# Patient Record
Sex: FEMALE | Race: WHITE | NOT HISPANIC OR LATINO | Employment: FULL TIME | URBAN - NONMETROPOLITAN AREA
[De-identification: names, ages, dates, MRNs, and addresses within clinical notes are randomized per-mention and may not be internally consistent; named-entity substitution may affect disease eponyms.]

---

## 2023-03-24 ENCOUNTER — TELEPHONE (OUTPATIENT)
Dept: PRIMARY CARE | Facility: CLINIC | Age: 53
End: 2023-03-24

## 2023-03-24 DIAGNOSIS — F17.200 SMOKING: Primary | ICD-10-CM

## 2023-03-24 RX ORDER — VARENICLINE TARTRATE 1 MG/1
1 TABLET, FILM COATED ORAL 2 TIMES DAILY
Qty: 60 TABLET | Refills: 2 | Status: SHIPPED | OUTPATIENT
Start: 2023-03-24 | End: 2023-08-24 | Stop reason: ALTCHOICE

## 2023-08-21 ENCOUNTER — TELEPHONE (OUTPATIENT)
Dept: PRIMARY CARE | Facility: CLINIC | Age: 53
End: 2023-08-21
Payer: COMMERCIAL

## 2023-08-21 DIAGNOSIS — Z00.00 ROUTINE GENERAL MEDICAL EXAMINATION AT A HEALTH CARE FACILITY: Primary | ICD-10-CM

## 2023-08-21 NOTE — PROGRESS NOTES
Subjective   Patient ID:   Maribel Thorpe is a 53 y.o. female who presents for Abdominal Pain.  HPI  Here with acute symptoms:  Symptoms x 1 month.  Abdominal pain.  LLQ pain.  Radiates to the back.  Off and on depending on what she eats.  Has actually improved over the last few days.  Has been watching her diet more closely and it has improved.  Denies blood in the stool.  Has history of diverticulitis.  Denies fever, chills.  Denies N/V/D.  Has also started taking probiotics which has helped.    Insomnia:  Sleeping 2-3 hours at night.  Had been on Trazodone before, but it was too high of a dose and she felt groggy.  Waking up almost every hour.    Labs were good yesterday.    Review of Systems  12 point review of systems negative unless stated above in HPI    Vitals:    08/24/23 0912   BP: 135/86   Pulse: 71   Resp: 18   SpO2: 97%     Physical Exam  General: Alert and oriented, well nourished, no acute distress.  Lungs: Clear to auscultation, non-labored respiration.  Heart: Normal rate, regular rhythm, no murmur, gallop or edema.  Abdomen: Slight TTP to the LLQ. No masses or distension.  Neurologic: Awake, alert, and oriented X3, CN II-XII intact.  Psychiatric: Cooperative, appropriate mood and affect.    Assessment/Plan   Labs from yesterday were reviewed.  Keep an eye on the abdominal pain for now.  We can pursue a CT scan if this comes back or gets worse.  I have sent in Trazodone to start for sleep.  Consider a sleep study.  If symptoms persist or worsen despite current plan of care, please contact your healthcare provider for further evaluation.  Patient instructed to contact the office if there are any questions regarding their care or treatment.   [facility] [phone].  [facility] [phone].    Fu 3 months  Diagnoses and all orders for this visit:  Primary insomnia  -     traZODone (Desyrel) 50 mg tablet; Take 1 tablet (50 mg) by mouth as needed at bedtime for  sleep.  LLQ pain  Hyperglycemia

## 2023-08-22 ENCOUNTER — LAB (OUTPATIENT)
Dept: LAB | Facility: LAB | Age: 53
End: 2023-08-22
Payer: COMMERCIAL

## 2023-08-22 DIAGNOSIS — Z00.00 ROUTINE GENERAL MEDICAL EXAMINATION AT A HEALTH CARE FACILITY: ICD-10-CM

## 2023-08-22 LAB
ALANINE AMINOTRANSFERASE (SGPT) (U/L) IN SER/PLAS: 15 U/L (ref 7–45)
ALBUMIN (G/DL) IN SER/PLAS: 4.2 G/DL (ref 3.4–5)
ALKALINE PHOSPHATASE (U/L) IN SER/PLAS: 73 U/L (ref 33–110)
ANION GAP IN SER/PLAS: 12 MMOL/L (ref 10–20)
ASPARTATE AMINOTRANSFERASE (SGOT) (U/L) IN SER/PLAS: 15 U/L (ref 9–39)
BASOPHILS (10*3/UL) IN BLOOD BY AUTOMATED COUNT: 0.03 X10E9/L (ref 0–0.1)
BASOPHILS/100 LEUKOCYTES IN BLOOD BY AUTOMATED COUNT: 0.3 % (ref 0–2)
BILIRUBIN TOTAL (MG/DL) IN SER/PLAS: 0.5 MG/DL (ref 0–1.2)
CALCIUM (MG/DL) IN SER/PLAS: 9.3 MG/DL (ref 8.6–10.3)
CARBON DIOXIDE, TOTAL (MMOL/L) IN SER/PLAS: 27 MMOL/L (ref 21–32)
CHLORIDE (MMOL/L) IN SER/PLAS: 105 MMOL/L (ref 98–107)
CHOLESTEROL (MG/DL) IN SER/PLAS: 165 MG/DL (ref 0–199)
CHOLESTEROL IN HDL (MG/DL) IN SER/PLAS: 78 MG/DL
CHOLESTEROL/HDL RATIO: 2.1
CREATININE (MG/DL) IN SER/PLAS: 0.8 MG/DL (ref 0.5–1.05)
EOSINOPHILS (10*3/UL) IN BLOOD BY AUTOMATED COUNT: 0.06 X10E9/L (ref 0–0.7)
EOSINOPHILS/100 LEUKOCYTES IN BLOOD BY AUTOMATED COUNT: 0.6 % (ref 0–6)
ERYTHROCYTE DISTRIBUTION WIDTH (RATIO) BY AUTOMATED COUNT: 12.3 % (ref 11.5–14.5)
ERYTHROCYTE MEAN CORPUSCULAR HEMOGLOBIN CONCENTRATION (G/DL) BY AUTOMATED: 32.7 G/DL (ref 32–36)
ERYTHROCYTE MEAN CORPUSCULAR VOLUME (FL) BY AUTOMATED COUNT: 100 FL (ref 80–100)
ERYTHROCYTES (10*6/UL) IN BLOOD BY AUTOMATED COUNT: 4.29 X10E12/L (ref 4–5.2)
GFR FEMALE: 87 ML/MIN/1.73M2
GLUCOSE (MG/DL) IN SER/PLAS: 106 MG/DL (ref 74–99)
HEMATOCRIT (%) IN BLOOD BY AUTOMATED COUNT: 42.8 % (ref 36–46)
HEMOGLOBIN (G/DL) IN BLOOD: 14 G/DL (ref 12–16)
IMMATURE GRANULOCYTES/100 LEUKOCYTES IN BLOOD BY AUTOMATED COUNT: 0.2 % (ref 0–0.9)
LDL: 71 MG/DL (ref 0–99)
LEUKOCYTES (10*3/UL) IN BLOOD BY AUTOMATED COUNT: 10.1 X10E9/L (ref 4.4–11.3)
LYMPHOCYTES (10*3/UL) IN BLOOD BY AUTOMATED COUNT: 1.6 X10E9/L (ref 1.2–4.8)
LYMPHOCYTES/100 LEUKOCYTES IN BLOOD BY AUTOMATED COUNT: 15.9 % (ref 13–44)
MONOCYTES (10*3/UL) IN BLOOD BY AUTOMATED COUNT: 0.56 X10E9/L (ref 0.1–1)
MONOCYTES/100 LEUKOCYTES IN BLOOD BY AUTOMATED COUNT: 5.6 % (ref 2–10)
NEUTROPHILS (10*3/UL) IN BLOOD BY AUTOMATED COUNT: 7.79 X10E9/L (ref 1.2–7.7)
NEUTROPHILS/100 LEUKOCYTES IN BLOOD BY AUTOMATED COUNT: 77.4 % (ref 40–80)
PLATELETS (10*3/UL) IN BLOOD AUTOMATED COUNT: 325 X10E9/L (ref 150–450)
POTASSIUM (MMOL/L) IN SER/PLAS: 4.6 MMOL/L (ref 3.5–5.3)
PROTEIN TOTAL: 6.5 G/DL (ref 6.4–8.2)
SODIUM (MMOL/L) IN SER/PLAS: 139 MMOL/L (ref 136–145)
THYROTROPIN (MIU/L) IN SER/PLAS BY DETECTION LIMIT <= 0.05 MIU/L: 0.6 MIU/L (ref 0.44–3.98)
TRIGLYCERIDE (MG/DL) IN SER/PLAS: 82 MG/DL (ref 0–149)
UREA NITROGEN (MG/DL) IN SER/PLAS: 10 MG/DL (ref 6–23)
VLDL: 16 MG/DL (ref 0–40)

## 2023-08-22 PROCEDURE — 36415 COLL VENOUS BLD VENIPUNCTURE: CPT

## 2023-08-22 PROCEDURE — 80061 LIPID PANEL: CPT

## 2023-08-22 PROCEDURE — 84443 ASSAY THYROID STIM HORMONE: CPT

## 2023-08-22 PROCEDURE — 80053 COMPREHEN METABOLIC PANEL: CPT

## 2023-08-22 PROCEDURE — 85025 COMPLETE CBC W/AUTO DIFF WBC: CPT

## 2023-08-24 ENCOUNTER — OFFICE VISIT (OUTPATIENT)
Dept: PRIMARY CARE | Facility: CLINIC | Age: 53
End: 2023-08-24
Payer: COMMERCIAL

## 2023-08-24 VITALS
HEART RATE: 71 BPM | BODY MASS INDEX: 23.67 KG/M2 | SYSTOLIC BLOOD PRESSURE: 135 MMHG | DIASTOLIC BLOOD PRESSURE: 86 MMHG | RESPIRATION RATE: 18 BRPM | OXYGEN SATURATION: 97 % | HEIGHT: 67 IN | WEIGHT: 150.8 LBS

## 2023-08-24 DIAGNOSIS — R73.9 HYPERGLYCEMIA: ICD-10-CM

## 2023-08-24 DIAGNOSIS — F51.01 PRIMARY INSOMNIA: Primary | ICD-10-CM

## 2023-08-24 DIAGNOSIS — R10.32 LLQ PAIN: ICD-10-CM

## 2023-08-24 PROBLEM — J01.10 ACUTE FRONTAL SINUSITIS: Status: RESOLVED | Noted: 2023-08-24 | Resolved: 2023-08-24

## 2023-08-24 PROBLEM — K57.90 DIVERTICULOSIS: Status: RESOLVED | Noted: 2023-08-24 | Resolved: 2023-08-24

## 2023-08-24 PROBLEM — F41.9 ANXIETY: Status: ACTIVE | Noted: 2023-08-24

## 2023-08-24 PROBLEM — E04.2 NONTOXIC MULTINODULAR GOITER: Status: ACTIVE | Noted: 2023-08-24

## 2023-08-24 PROBLEM — M25.521 RIGHT ELBOW PAIN: Status: RESOLVED | Noted: 2023-08-24 | Resolved: 2023-08-24

## 2023-08-24 PROBLEM — E03.9 HYPOTHYROIDISM: Status: ACTIVE | Noted: 2023-08-24

## 2023-08-24 PROBLEM — R79.89 ABNORMAL TSH: Status: ACTIVE | Noted: 2023-08-24

## 2023-08-24 PROBLEM — R05.9 COUGH: Status: RESOLVED | Noted: 2023-08-24 | Resolved: 2023-08-24

## 2023-08-24 PROBLEM — M79.10 MYALGIA: Status: RESOLVED | Noted: 2023-08-24 | Resolved: 2023-08-24

## 2023-08-24 PROBLEM — B34.9 VIRAL ILLNESS: Status: RESOLVED | Noted: 2023-08-24 | Resolved: 2023-08-24

## 2023-08-24 PROBLEM — L73.9 FOLLICULITIS: Status: RESOLVED | Noted: 2023-08-24 | Resolved: 2023-08-24

## 2023-08-24 PROBLEM — J02.9 ACUTE PHARYNGITIS: Status: RESOLVED | Noted: 2023-08-24 | Resolved: 2023-08-24

## 2023-08-24 PROBLEM — G47.00 INSOMNIA: Status: RESOLVED | Noted: 2023-08-24 | Resolved: 2023-08-24

## 2023-08-24 PROBLEM — M25.569 KNEE PAIN: Status: RESOLVED | Noted: 2023-08-24 | Resolved: 2023-08-24

## 2023-08-24 PROBLEM — J02.9 SORE THROAT: Status: RESOLVED | Noted: 2023-08-24 | Resolved: 2023-08-24

## 2023-08-24 PROBLEM — R42 DIZZINESS: Status: RESOLVED | Noted: 2023-08-24 | Resolved: 2023-08-24

## 2023-08-24 PROBLEM — J01.00 ACUTE MAXILLARY SINUSITIS: Status: RESOLVED | Noted: 2023-08-24 | Resolved: 2023-08-24

## 2023-08-24 PROBLEM — L40.9 PSORIASIS: Status: ACTIVE | Noted: 2023-08-24

## 2023-08-24 PROBLEM — K21.9 GASTROESOPHAGEAL REFLUX DISEASE: Status: ACTIVE | Noted: 2023-08-24

## 2023-08-24 PROBLEM — R94.6 ABNORMAL THYROID FUNCTION TEST: Status: ACTIVE | Noted: 2023-08-24

## 2023-08-24 PROBLEM — F17.200 SMOKING: Status: RESOLVED | Noted: 2023-08-24 | Resolved: 2023-08-24

## 2023-08-24 PROBLEM — R53.83 FATIGUE: Status: RESOLVED | Noted: 2023-08-24 | Resolved: 2023-08-24

## 2023-08-24 PROBLEM — J30.9 ALLERGIC RHINITIS: Status: RESOLVED | Noted: 2023-08-24 | Resolved: 2023-08-24

## 2023-08-24 PROBLEM — R10.9 ABDOMINAL PAIN: Status: ACTIVE | Noted: 2023-08-24

## 2023-08-24 PROBLEM — J20.9 ACUTE BRONCHITIS: Status: RESOLVED | Noted: 2023-08-24 | Resolved: 2023-08-24

## 2023-08-24 PROBLEM — E04.1 COLD THYROID NODULE: Status: RESOLVED | Noted: 2023-08-24 | Resolved: 2023-08-24

## 2023-08-24 PROBLEM — H81.10 BENIGN PAROXYSMAL POSITIONAL VERTIGO: Status: RESOLVED | Noted: 2023-08-24 | Resolved: 2023-08-24

## 2023-08-24 PROBLEM — L40.50 PSORIATIC ARTHRITIS (MULTI): Status: ACTIVE | Noted: 2023-08-24

## 2023-08-24 PROCEDURE — 99214 OFFICE O/P EST MOD 30 MIN: CPT | Performed by: PHYSICIAN ASSISTANT

## 2023-08-24 RX ORDER — TRAZODONE HYDROCHLORIDE 50 MG/1
50 TABLET ORAL NIGHTLY PRN
Qty: 90 TABLET | Refills: 0 | Status: SHIPPED | OUTPATIENT
Start: 2023-08-24 | End: 2024-01-16 | Stop reason: SDUPTHER

## 2023-08-24 RX ORDER — APREMILAST 30 MG/1
30 TABLET, FILM COATED ORAL 2 TIMES DAILY
COMMUNITY
Start: 2022-06-08

## 2023-08-24 ASSESSMENT — PAIN SCALES - GENERAL: PAINLEVEL: 4

## 2023-09-01 DIAGNOSIS — R10.32 LLQ PAIN: Primary | ICD-10-CM

## 2023-09-22 NOTE — PROGRESS NOTES
Subjective   Patient ID:   Maribel Thorpe is a 53 y.o. female who presents for No chief complaint on file..  HPI  Abdominal pain:  We are keeping an eye on this.  LLQ pain.  Consider abdominal CT.    Psoriasis:  Seeing dermatology.  She does have history of psoriasis.  Taking Otezla.     GERD:  Had been on Nexium in the past.     Insomnia:  I gave Trazodone last visit.     Multinodular goiter:  Has had a biopsy in the past that was benign.  Had mild [place]romeg[place] in [place] in [place] 2020.  Last checked Aug 2023.  Had seen endocrine in the past.     Health maintenance:  Smoking: Current smoker. 12-15 cigarettes per day.  Mammogram (40-75): Sep 2022. DUE  Labs: Aug 2023.  Colonoscopy (50-75): DUE - cologuard  Influenza:     Review of Systems  12 point review of systems negative unless stated above in HPI    There were no vitals filed for this visit.    Physical Exam  General: Alert and oriented, well nourished, no acute distress.  Lungs: Clear to auscultation, non-labored respiration.  Heart: Normal rate, regular rhythm, no murmur, gallop or edema.  Neurologic: Awake, alert, and oriented X3, CN II-XII intact.  Psychiatric: Cooperative, appropriate mood and affect.    Assessment/Plan   Diagnoses and all orders for this visit:  Routine general medical examination at a health care facility  Psoriatic arthritis (CMS/HCC)  Nontoxic multinodular goiter  Gastroesophageal reflux disease without esophagitis  Primary insomnia  Abdominal pain, unspecified abdominal location  Visit for screening mammogram

## 2023-09-25 ENCOUNTER — OFFICE VISIT (OUTPATIENT)
Dept: PRIMARY CARE | Facility: CLINIC | Age: 53
End: 2023-09-25
Payer: COMMERCIAL

## 2023-09-25 VITALS
RESPIRATION RATE: 18 BRPM | BODY MASS INDEX: 24.01 KG/M2 | DIASTOLIC BLOOD PRESSURE: 84 MMHG | SYSTOLIC BLOOD PRESSURE: 130 MMHG | HEART RATE: 66 BPM | OXYGEN SATURATION: 97 % | WEIGHT: 153 LBS | HEIGHT: 67 IN

## 2023-09-25 DIAGNOSIS — K21.9 GASTROESOPHAGEAL REFLUX DISEASE WITHOUT ESOPHAGITIS: ICD-10-CM

## 2023-09-25 DIAGNOSIS — F51.01 PRIMARY INSOMNIA: ICD-10-CM

## 2023-09-25 DIAGNOSIS — Z12.31 VISIT FOR SCREENING MAMMOGRAM: ICD-10-CM

## 2023-09-25 DIAGNOSIS — L40.50 PSORIATIC ARTHRITIS (MULTI): ICD-10-CM

## 2023-09-25 DIAGNOSIS — Z00.00 ROUTINE GENERAL MEDICAL EXAMINATION AT A HEALTH CARE FACILITY: Primary | ICD-10-CM

## 2023-09-25 DIAGNOSIS — R10.9 ABDOMINAL PAIN, UNSPECIFIED ABDOMINAL LOCATION: ICD-10-CM

## 2023-09-25 DIAGNOSIS — E04.2 NONTOXIC MULTINODULAR GOITER: ICD-10-CM

## 2023-09-25 PROBLEM — N95.1 MENOPAUSAL SYMPTOMS: Status: ACTIVE | Noted: 2023-09-25

## 2023-09-25 PROCEDURE — 99396 PREV VISIT EST AGE 40-64: CPT | Performed by: PHYSICIAN ASSISTANT

## 2023-09-25 ASSESSMENT — PATIENT HEALTH QUESTIONNAIRE - PHQ9
1. LITTLE INTEREST OR PLEASURE IN DOING THINGS: NOT AT ALL
SUM OF ALL RESPONSES TO PHQ9 QUESTIONS 1 AND 2: 0
2. FEELING DOWN, DEPRESSED OR HOPELESS: NOT AT ALL

## 2023-09-25 ASSESSMENT — PAIN SCALES - GENERAL: PAINLEVEL: 0-NO PAIN

## 2023-09-25 NOTE — PROGRESS NOTES
Subjective   Patient ID:   Maribel Thorpe is a 53 y.o. female who presents for Annual Exam.  HPI  Pain scale: 0 (no pain)  Living will? No  POA? No  Are you currently or have you recently been threatened or abused? No  Do you feel unsafe going back to the place you are living? No  Reported health: Excellent  Dental visits? Yes  Hearing problems? No  Vision problems? Yes - wears glasses  Healthy diet? Yes  Exercise? Exercise 4-5x per week  Adequate fluid intake? Yes    Abdominal pain:  This has improved!  We are keeping an eye on this.  LLQ pain.  Consider abdominal CT.    Psoriasis:  Seeing dermatology.  She does have history of psoriasis.  Taking Otezla.     GERD:  Had been on Nexium in the past.     Insomnia:  I gave Trazodone last visit.     Multinodular goiter:  Has had a biopsy in the past that was benign.  [place] in [place] in [place].  Last checked Aug 2023.  Had seen endocrine in the past.     Health maintenance:  Smoking: Current smoker. 12-15 cigarettes per day.  Mammogram (40-75): Sep 2022. DUE  Labs: Aug 2023.  Colonoscopy (50-75): Negative cologuard in 2022.  Influenza:     Social History     Tobacco Use    Smoking status: Every Day     Types: Cigarettes    Smokeless tobacco: Never   Substance Use Topics    Alcohol use: Yes     Review of Systems  12 point review of systems negative unless stated above in HPI    Vitals:    09/25/23 1159   BP: 130/84   Pulse: 66   Resp: 18   SpO2: 97%     Physical Exam  General: Alert and oriented, well nourished, no acute distress.  Lungs: Clear to auscultation, non-labored respiration.  Heart: Normal rate, regular rhythm, no murmur, gallop or edema.  Neurologic: Awake, alert, and oriented X3, CN II-XII intact.  Psychiatric: Cooperative, appropriate mood and affect.    Assessment/Plan   This counts as your annual Wellness visit.  Health maintenance was reviewed today.  Depression screening is negative.  I have ordered you a mammogram to be done as soon as you  are able.  We will call the results.  Continue the same medications.  Chronic conditions are stable.  Call with questions or concerns.    Follow up  6 months  Diagnoses and all orders for this visit:  Routine general medical examination at a health care facility  Psoriatic arthritis (CMS/HCC)  Nontoxic multinodular goiter  Gastroesophageal reflux disease without esophagitis  Primary insomnia  Abdominal pain, unspecified abdominal location  Visit for screening mammogram  -     BI mammo bilateral screening tomosynthesis; Future

## 2023-10-02 ENCOUNTER — APPOINTMENT (OUTPATIENT)
Dept: RADIOLOGY | Facility: HOSPITAL | Age: 53
End: 2023-10-02
Payer: COMMERCIAL

## 2023-10-05 ENCOUNTER — HOSPITAL ENCOUNTER (OUTPATIENT)
Dept: RADIOLOGY | Facility: HOSPITAL | Age: 53
Discharge: HOME | End: 2023-10-05
Payer: COMMERCIAL

## 2023-10-05 DIAGNOSIS — Z12.31 VISIT FOR SCREENING MAMMOGRAM: ICD-10-CM

## 2023-10-05 PROCEDURE — 77067 SCR MAMMO BI INCL CAD: CPT | Mod: 50

## 2023-10-05 PROCEDURE — 77063 BREAST TOMOSYNTHESIS BI: CPT | Mod: BILATERAL PROCEDURE | Performed by: STUDENT IN AN ORGANIZED HEALTH CARE EDUCATION/TRAINING PROGRAM

## 2023-10-05 PROCEDURE — 77067 SCR MAMMO BI INCL CAD: CPT | Mod: BILATERAL PROCEDURE | Performed by: STUDENT IN AN ORGANIZED HEALTH CARE EDUCATION/TRAINING PROGRAM

## 2024-01-16 DIAGNOSIS — F51.01 PRIMARY INSOMNIA: ICD-10-CM

## 2024-01-16 RX ORDER — TRAZODONE HYDROCHLORIDE 50 MG/1
50 TABLET ORAL NIGHTLY PRN
Qty: 90 TABLET | Refills: 0 | Status: SHIPPED | OUTPATIENT
Start: 2024-01-16 | End: 2024-03-29

## 2024-03-28 DIAGNOSIS — F51.01 PRIMARY INSOMNIA: ICD-10-CM

## 2024-03-29 RX ORDER — TRAZODONE HYDROCHLORIDE 50 MG/1
TABLET ORAL
Qty: 90 TABLET | Refills: 0 | Status: SHIPPED | OUTPATIENT
Start: 2024-03-29

## 2024-06-18 DIAGNOSIS — F51.01 PRIMARY INSOMNIA: ICD-10-CM

## 2024-06-19 RX ORDER — TRAZODONE HYDROCHLORIDE 50 MG/1
TABLET ORAL
Qty: 90 TABLET | Refills: 0 | OUTPATIENT
Start: 2024-06-19

## 2024-06-20 RX ORDER — TRAZODONE HYDROCHLORIDE 50 MG/1
TABLET ORAL
Qty: 7 TABLET | Refills: 0 | Status: SHIPPED | OUTPATIENT
Start: 2024-06-20

## 2024-06-24 ENCOUNTER — APPOINTMENT (OUTPATIENT)
Dept: PRIMARY CARE | Facility: CLINIC | Age: 54
End: 2024-06-24
Payer: COMMERCIAL

## 2024-06-24 VITALS
SYSTOLIC BLOOD PRESSURE: 127 MMHG | HEIGHT: 67 IN | DIASTOLIC BLOOD PRESSURE: 82 MMHG | BODY MASS INDEX: 22.91 KG/M2 | HEART RATE: 72 BPM | OXYGEN SATURATION: 96 % | WEIGHT: 146 LBS

## 2024-06-24 DIAGNOSIS — F51.01 PRIMARY INSOMNIA: ICD-10-CM

## 2024-06-24 DIAGNOSIS — F17.200 NICOTINE DEPENDENCE WITH CURRENT USE: ICD-10-CM

## 2024-06-24 DIAGNOSIS — L40.50 PSORIATIC ARTHRITIS (MULTI): ICD-10-CM

## 2024-06-24 DIAGNOSIS — Z71.6 ENCOUNTER FOR SMOKING CESSATION COUNSELING: Primary | ICD-10-CM

## 2024-06-24 PROCEDURE — 99213 OFFICE O/P EST LOW 20 MIN: CPT

## 2024-06-24 PROCEDURE — 4004F PT TOBACCO SCREEN RCVD TLK: CPT

## 2024-06-24 RX ORDER — TRAZODONE HYDROCHLORIDE 100 MG/1
100 TABLET ORAL NIGHTLY PRN
Qty: 90 TABLET | Refills: 0 | Status: SHIPPED | OUTPATIENT
Start: 2024-06-24 | End: 2024-09-22

## 2024-06-24 RX ORDER — VARENICLINE TARTRATE 0.5 MG/1
TABLET, FILM COATED ORAL DAILY
Qty: 319 TABLET | Refills: 0 | Status: SHIPPED | OUTPATIENT
Start: 2024-06-24 | End: 2024-09-16

## 2024-06-24 ASSESSMENT — ENCOUNTER SYMPTOMS
ABDOMINAL PAIN: 0
VOMITING: 0
HEADACHES: 0
NAUSEA: 0
DIARRHEA: 0
SHORTNESS OF BREATH: 0
CONSTIPATION: 0
CHILLS: 0
FATIGUE: 0
FEVER: 0
DIZZINESS: 0

## 2024-06-24 ASSESSMENT — PATIENT HEALTH QUESTIONNAIRE - PHQ9
SUM OF ALL RESPONSES TO PHQ9 QUESTIONS 1 AND 2: 0
1. LITTLE INTEREST OR PLEASURE IN DOING THINGS: NOT AT ALL
2. FEELING DOWN, DEPRESSED OR HOPELESS: NOT AT ALL

## 2024-06-24 NOTE — PROGRESS NOTES
"Subjective   Patient ID: Maribel Thorpe is a 54 y.o. female who presents for Med Refill (Patient taking Trazodone 100mg - needs renewed).    HPI   54 y.o. female with PMH remarkable for GERD, Hypothyroidism, psoriasis, anxiety, diverticulosis who presents to the office today for med refill for insomnia.     Insomnia:  Takes 50mg Trazodone after dinner and then 50mg at bedtime, this helps sleep through the night    Smoking cessation:  - Would like to quit. Has quit in the past using Chantix and was able to refrain from smoking for 3 years. Would like to try chantix again     Review of Systems   Constitutional:  Negative for chills, fatigue and fever.   Respiratory:  Negative for shortness of breath.    Cardiovascular:  Negative for chest pain.   Gastrointestinal:  Negative for abdominal pain, constipation, diarrhea, nausea and vomiting.   Neurological:  Negative for dizziness and headaches.   All other systems reviewed and are negative.      Objective   /82   Pulse 72   Ht 1.702 m (5' 7\")   Wt 66.2 kg (146 lb)   SpO2 96%   BMI 22.87 kg/m²     Physical Exam  Constitutional:       Appearance: Normal appearance.   HENT:      Head: Normocephalic and atraumatic.   Eyes:      Extraocular Movements: Extraocular movements intact.      Conjunctiva/sclera: Conjunctivae normal.   Neck:      Vascular: No carotid bruit.   Cardiovascular:      Rate and Rhythm: Normal rate and regular rhythm.   Pulmonary:      Effort: Pulmonary effort is normal.      Breath sounds: Wheezing present. No rhonchi or rales.   Musculoskeletal:      Cervical back: Normal range of motion and neck supple.   Neurological:      General: No focal deficit present.      Mental Status: She is alert and oriented to person, place, and time. Mental status is at baseline.   Psychiatric:         Mood and Affect: Mood normal.         Behavior: Behavior normal.         Thought Content: Thought content normal.         Judgment: Judgment normal. "         Assessment/Plan   Problem List Items Addressed This Visit             ICD-10-CM    Psoriatic arthritis (Multi) L40.50    Nicotine dependence with current use F17.200    Relevant Medications    varenicline (Chantix) 0.5 mg tablet    Primary insomnia F51.01    Relevant Medications    traZODone (Desyrel) 100 mg tablet     Other Visit Diagnoses         Codes    Encounter for smoking cessation counseling    -  Primary Z71.6    Relevant Medications    varenicline (Chantix) 0.5 mg tablet          - Patient to begin Chantix for smoking cessation. Risks and benefits discussed.   - Refill placed.  - Patient understands seeking care at ER with worsening symptoms.  - Follow up in 4 months for annual physical exam and blood work.

## 2024-09-25 DIAGNOSIS — F51.01 PRIMARY INSOMNIA: ICD-10-CM

## 2024-09-25 RX ORDER — TRAZODONE HYDROCHLORIDE 100 MG/1
TABLET ORAL
Qty: 90 TABLET | Refills: 0 | Status: SHIPPED | OUTPATIENT
Start: 2024-09-25

## 2024-10-01 DIAGNOSIS — Z12.31 VISIT FOR SCREENING MAMMOGRAM: ICD-10-CM

## 2024-10-09 ENCOUNTER — HOSPITAL ENCOUNTER (OUTPATIENT)
Dept: RADIOLOGY | Facility: HOSPITAL | Age: 54
Discharge: HOME | End: 2024-10-09
Payer: COMMERCIAL

## 2024-10-09 VITALS — HEIGHT: 67 IN | WEIGHT: 145 LBS | BODY MASS INDEX: 22.76 KG/M2

## 2024-10-09 DIAGNOSIS — Z12.31 VISIT FOR SCREENING MAMMOGRAM: ICD-10-CM

## 2024-10-09 PROCEDURE — 77067 SCR MAMMO BI INCL CAD: CPT | Performed by: RADIOLOGY

## 2024-10-09 PROCEDURE — 77063 BREAST TOMOSYNTHESIS BI: CPT | Performed by: RADIOLOGY

## 2024-10-09 PROCEDURE — 77067 SCR MAMMO BI INCL CAD: CPT

## 2024-11-08 PROBLEM — F17.200 CURRENT SMOKER: Status: ACTIVE | Noted: 2024-11-08

## 2024-11-11 ENCOUNTER — APPOINTMENT (OUTPATIENT)
Dept: PRIMARY CARE | Facility: CLINIC | Age: 54
End: 2024-11-11
Payer: COMMERCIAL

## 2024-11-11 VITALS
RESPIRATION RATE: 16 BRPM | WEIGHT: 144.2 LBS | OXYGEN SATURATION: 96 % | DIASTOLIC BLOOD PRESSURE: 77 MMHG | HEART RATE: 93 BPM | BODY MASS INDEX: 22.63 KG/M2 | SYSTOLIC BLOOD PRESSURE: 113 MMHG | TEMPERATURE: 96.8 F | HEIGHT: 67 IN

## 2024-11-11 DIAGNOSIS — L40.50 PSORIATIC ARTHRITIS (MULTI): ICD-10-CM

## 2024-11-11 DIAGNOSIS — F51.01 PRIMARY INSOMNIA: ICD-10-CM

## 2024-11-11 DIAGNOSIS — R79.89 ABNORMAL TSH: ICD-10-CM

## 2024-11-11 DIAGNOSIS — Z12.11 COLON CANCER SCREENING: ICD-10-CM

## 2024-11-11 DIAGNOSIS — M25.50 POLYARTHRALGIA: ICD-10-CM

## 2024-11-11 DIAGNOSIS — Z00.00 HEALTHCARE MAINTENANCE: ICD-10-CM

## 2024-11-11 DIAGNOSIS — F17.200 CURRENT SMOKER: ICD-10-CM

## 2024-11-11 DIAGNOSIS — Z13.220 LIPID SCREENING: ICD-10-CM

## 2024-11-11 DIAGNOSIS — K21.9 GASTROESOPHAGEAL REFLUX DISEASE WITHOUT ESOPHAGITIS: ICD-10-CM

## 2024-11-11 PROCEDURE — 99396 PREV VISIT EST AGE 40-64: CPT | Performed by: INTERNAL MEDICINE

## 2024-11-11 PROCEDURE — 4004F PT TOBACCO SCREEN RCVD TLK: CPT | Performed by: INTERNAL MEDICINE

## 2024-11-11 PROCEDURE — 3008F BODY MASS INDEX DOCD: CPT | Performed by: INTERNAL MEDICINE

## 2024-11-11 RX ORDER — MELOXICAM 15 MG/1
15 TABLET ORAL DAILY
Qty: 30 TABLET | Refills: 0 | Status: SHIPPED | OUTPATIENT
Start: 2024-11-11 | End: 2025-11-11

## 2024-11-11 ASSESSMENT — PATIENT HEALTH QUESTIONNAIRE - PHQ9
2. FEELING DOWN, DEPRESSED OR HOPELESS: NOT AT ALL
SUM OF ALL RESPONSES TO PHQ9 QUESTIONS 1 AND 2: 0
1. LITTLE INTEREST OR PLEASURE IN DOING THINGS: NOT AT ALL

## 2024-11-11 ASSESSMENT — PAIN SCALES - GENERAL: PAINLEVEL_OUTOF10: 0-NO PAIN

## 2024-11-11 ASSESSMENT — COLUMBIA-SUICIDE SEVERITY RATING SCALE - C-SSRS
6. HAVE YOU EVER DONE ANYTHING, STARTED TO DO ANYTHING, OR PREPARED TO DO ANYTHING TO END YOUR LIFE?: NO
1. IN THE PAST MONTH, HAVE YOU WISHED YOU WERE DEAD OR WISHED YOU COULD GO TO SLEEP AND NOT WAKE UP?: NO
2. HAVE YOU ACTUALLY HAD ANY THOUGHTS OF KILLING YOURSELF?: NO

## 2024-11-11 ASSESSMENT — ENCOUNTER SYMPTOMS
CONSTITUTIONAL NEGATIVE: 1
NEUROLOGICAL NEGATIVE: 1
GASTROINTESTINAL NEGATIVE: 1
RESPIRATORY NEGATIVE: 1
ARTHRALGIAS: 1
PSYCHIATRIC NEGATIVE: 1

## 2024-11-11 NOTE — PATIENT INSTRUCTIONS
It was great to see you in the office today! Here is what we discussed at your visit today:  We will send in prescription for Meloxicam 15mg to take as needed for joint pain, try to take maximum of 3 times per week  Avoid nsaids such as ibuprofen, aleve, naproxen, motrin while taking this medication  Please get bloodwork drawn as soon as you are able. We will call you with results.  A referral was placed for cologuard test kit. Please complete this once you receive it and then send it back according to directions on back. We will call you with results once we receive them    Schedule low dose CT lungs due to your history of smoking, we will call with results      Please call to schedule appointment with dermatology to continue your Otezla, call if you have issues with refill before January  [facility]  [address] [place] [address]  Phone number [phone]    Follow up in six months

## 2024-11-11 NOTE — PROGRESS NOTES
Patient ID: She states that her psoriasis is stable on current medications. She has been on Otezla since October 2016. She states she has been followed by derm for this, does not have rheumatologist at this time. She states that she needs new derm as hers is retiring and she has zero copay for otezla, needs prescription refilled in January. She states that she has a lot of pain in her knees, sometimes in her hands. She states she is very active, states she was going to the gym five times per week, but now is only going 3 times per week, stays very active, goes up and down the stairs and mows grass. She states that she had hysterectomy in 2003, is unsure if she is in menopause or not. She states she has one ovary. She states she is still smoking cigarettes. She states she takes four Ibuprofen frequently for joint pain.    HPI: Maribel Thorpe is a 54 y.o. female with PMH remarkable for GERD, Hypothyroidism, psoriasis, anxiety, diverticulosis  who presents to the office today for annual physical exam.     HEALTH MAINTENANCE: Annual  Wellness Physical  Last Office Visit: 6/24/24 with Victorina Redman  Mammogram (40-75):  10/09/24 wnl  Pap smear (21-65, or hysterectomy q5yrs): per OB/GYN  Last Labs: 8/22/23  Colonoscopy (45-75 or age 40 with 1st degree relative dx colon ca): negative cologuard  8/30/2021  Lung cancer screening (50-76 y/o x 20 pk yr for at least 20 yrs + current smoker OR quit in last 15 years, no CT w/I last year): never had  DEXA (65+, q 2 years): na    Social History     Tobacco Use    Smoking status: Every Day     Types: Cigarettes     Start date: 1983    Smokeless tobacco: Never    Tobacco comments:     0.5ppd - 41 year history     Has taken Chantix in the past and quit for 3 years   Substance Use Topics    Alcohol use: Yes    Drug use: Never     Immunization History   Administered Date(s) Administered    Flu vaccine (IIV4), preservative free *Check age/dose* 10/26/2022    Influenza, seasonal,  "injectable 10/04/2017    Pfizer Purple Cap SARS-CoV-2 07/23/2021, 08/22/2021    Zoster vaccine, recombinant, adult (SHINGRIX) 05/06/2022, 07/09/2022     Review of Systems   Constitutional: Negative.    HENT: Negative.     Respiratory: Negative.     Gastrointestinal: Negative.    Genitourinary: Negative.    Musculoskeletal:  Positive for arthralgias.   Skin:  Positive for rash.   Neurological: Negative.    Psychiatric/Behavioral: Negative.       No Known Allergies    Visit Vitals  Vitals:    11/11/24 0909   BP: 113/77   BP Location: Right arm   Pulse: 93   Resp: 16   Temp: 36 °C (96.8 °F)   TempSrc: Temporal   SpO2: 96%   Weight: 65.4 kg (144 lb 3.2 oz)   Height: 1.702 m (5' 7\")      OB Status Hysterectomy   Smoking Status Every Day       Physical Exam  Vitals reviewed.   Constitutional:       Appearance: Normal appearance.   HENT:      Head: Normocephalic and atraumatic.   Cardiovascular:      Rate and Rhythm: Normal rate and regular rhythm.      Pulses: Normal pulses.      Heart sounds: Normal heart sounds.   Pulmonary:      Effort: Pulmonary effort is normal.      Breath sounds: Normal breath sounds.   Abdominal:      General: Bowel sounds are normal.      Palpations: Abdomen is soft.   Musculoskeletal:         General: Tenderness present. Normal range of motion.   Skin:     General: Skin is warm and dry.   Neurological:      General: No focal deficit present.      Mental Status: She is alert and oriented to person, place, and time.   Psychiatric:         Mood and Affect: Mood normal.         Behavior: Behavior normal.         Current Outpatient Medications   Medication Instructions    Otezla 30 mg, oral, 2 times daily    traZODone (Desyrel) 100 mg tablet TAKE 1 TABLET(100 MG) BY MOUTH AT BEDTIME AS NEEDED FOR SLEEP    varenicline (Chantix) 0.5 mg tablet Take 1 tablet (0.5 mg) by mouth once daily for 3 days, THEN 1 tablet (0.5 mg) 2 times a day for 4 days, THEN 2 tablets (1 mg) 2 times a day. Take with full glass " of water..       Lab Results   Component Value Date    WBC 10.1 08/22/2023    HGB 14.0 08/22/2023    HCT 42.8 08/22/2023     08/22/2023    CHOL 165 08/22/2023    TRIG 82 08/22/2023    HDL 78.0 08/22/2023    ALT 15 08/22/2023    AST 15 08/22/2023     08/22/2023    K 4.6 08/22/2023     08/22/2023    CREATININE 0.80 08/22/2023    BUN 10 08/22/2023    CO2 27 08/22/2023    TSH 0.60 08/22/2023       Problem List Items Addressed This Visit             ICD-10-CM    Abnormal TSH R79.89    Relevant Orders    Tsh With Reflex To Free T4 If Abnormal    Gastroesophageal reflux disease K21.9    Relevant Orders    CBC and Auto Differential    Psoriatic arthritis (Multi)  - stable on Otezla  - information given for a new dermatologist due to hers retiring  - advised to call if any issues with getting otezla refilled before she needs it for her insurance L40.50    Relevant Orders    Comprehensive metabolic panel    Vitamin D 25-Hydroxy,Total (for eval of Vitamin D levels)    Vitamin B12    Primary insomnia  - continue with Trazodone F51.01    Current smoker F17.200    Relevant Orders    CT lung screening low dose     Other Visit Diagnoses         Codes    Colon cancer screening     Z12.11    Relevant Orders    Cologuard® colon cancer screening    Polyarthralgia      - discussed risks of taking NSAIDS daily long term, advised to try to take maximum of three times per week, unless she is having pain  M25.50    Relevant Medications    meloxicam (Mobic) 15 mg tablet    Healthcare maintenance     Z00.00    Relevant Orders    Vitamin D 25-Hydroxy,Total (for eval of Vitamin D levels)    Vitamin B12    Lipid screening     Z13.220    Relevant Orders    Lipid panel          --------------------  Written by Lety Simons LPN, acting as a scribe for Dr. Velázquez. This note accurately reflects the work and decisions made by Dr. Velázquez.     I, Dr. Velázquez, attest all medical record entries made by the scribe were under my direction and  were personally dictated by me. I have reviewed the chart and agree that the record accurately reflects my performance of the history, physical exam, and assessment and plan.

## 2024-11-18 ENCOUNTER — LAB (OUTPATIENT)
Dept: LAB | Facility: LAB | Age: 54
End: 2024-11-18
Payer: COMMERCIAL

## 2024-11-18 DIAGNOSIS — R79.89 ABNORMAL TSH: ICD-10-CM

## 2024-11-18 DIAGNOSIS — K21.9 GASTROESOPHAGEAL REFLUX DISEASE WITHOUT ESOPHAGITIS: ICD-10-CM

## 2024-11-18 DIAGNOSIS — Z13.220 LIPID SCREENING: ICD-10-CM

## 2024-11-18 DIAGNOSIS — L40.50 PSORIATIC ARTHRITIS (MULTI): ICD-10-CM

## 2024-11-18 DIAGNOSIS — Z00.00 HEALTHCARE MAINTENANCE: ICD-10-CM

## 2024-11-18 LAB
ALBUMIN SERPL BCP-MCNC: 4.2 G/DL (ref 3.4–5)
ALP SERPL-CCNC: 62 U/L (ref 33–110)
ALT SERPL W P-5'-P-CCNC: 25 U/L (ref 7–45)
ANION GAP SERPL CALC-SCNC: 15 MMOL/L (ref 10–20)
AST SERPL W P-5'-P-CCNC: 20 U/L (ref 9–39)
BASOPHILS # BLD AUTO: 0.04 X10*3/UL (ref 0–0.1)
BASOPHILS NFR BLD AUTO: 0.6 %
BILIRUB SERPL-MCNC: 0.6 MG/DL (ref 0–1.2)
BUN SERPL-MCNC: 16 MG/DL (ref 6–23)
CALCIUM SERPL-MCNC: 9.5 MG/DL (ref 8.6–10.3)
CHLORIDE SERPL-SCNC: 104 MMOL/L (ref 98–107)
CHOLEST SERPL-MCNC: 188 MG/DL (ref 0–199)
CHOLESTEROL/HDL RATIO: 2.1
CO2 SERPL-SCNC: 29 MMOL/L (ref 21–32)
CREAT SERPL-MCNC: 0.83 MG/DL (ref 0.5–1.05)
EGFRCR SERPLBLD CKD-EPI 2021: 83 ML/MIN/1.73M*2
EOSINOPHIL # BLD AUTO: 0.04 X10*3/UL (ref 0–0.7)
EOSINOPHIL NFR BLD AUTO: 0.6 %
ERYTHROCYTE [DISTWIDTH] IN BLOOD BY AUTOMATED COUNT: 12.5 % (ref 11.5–14.5)
GLUCOSE SERPL-MCNC: 99 MG/DL (ref 74–99)
HCT VFR BLD AUTO: 42.6 % (ref 36–46)
HDLC SERPL-MCNC: 87.6 MG/DL
HGB BLD-MCNC: 13.8 G/DL (ref 12–16)
IMM GRANULOCYTES # BLD AUTO: 0.02 X10*3/UL (ref 0–0.7)
IMM GRANULOCYTES NFR BLD AUTO: 0.3 % (ref 0–0.9)
LDLC SERPL CALC-MCNC: 88 MG/DL
LYMPHOCYTES # BLD AUTO: 2.14 X10*3/UL (ref 1.2–4.8)
LYMPHOCYTES NFR BLD AUTO: 31.8 %
MCH RBC QN AUTO: 32.4 PG (ref 26–34)
MCHC RBC AUTO-ENTMCNC: 32.4 G/DL (ref 32–36)
MCV RBC AUTO: 100 FL (ref 80–100)
MONOCYTES # BLD AUTO: 0.53 X10*3/UL (ref 0.1–1)
MONOCYTES NFR BLD AUTO: 7.9 %
NEUTROPHILS # BLD AUTO: 3.96 X10*3/UL (ref 1.2–7.7)
NEUTROPHILS NFR BLD AUTO: 58.8 %
NON HDL CHOLESTEROL: 100 MG/DL (ref 0–149)
NRBC BLD-RTO: 0 /100 WBCS (ref 0–0)
PLATELET # BLD AUTO: 266 X10*3/UL (ref 150–450)
POTASSIUM SERPL-SCNC: 4.7 MMOL/L (ref 3.5–5.3)
PROT SERPL-MCNC: 6.1 G/DL (ref 6.4–8.2)
RBC # BLD AUTO: 4.26 X10*6/UL (ref 4–5.2)
SODIUM SERPL-SCNC: 143 MMOL/L (ref 136–145)
TRIGL SERPL-MCNC: 62 MG/DL (ref 0–149)
TSH SERPL-ACNC: 0.66 MIU/L (ref 0.44–3.98)
VLDL: 12 MG/DL (ref 0–40)
WBC # BLD AUTO: 6.7 X10*3/UL (ref 4.4–11.3)

## 2024-11-18 PROCEDURE — 84443 ASSAY THYROID STIM HORMONE: CPT

## 2024-11-18 PROCEDURE — 82607 VITAMIN B-12: CPT

## 2024-11-18 PROCEDURE — 80061 LIPID PANEL: CPT

## 2024-11-18 PROCEDURE — 80053 COMPREHEN METABOLIC PANEL: CPT

## 2024-11-18 PROCEDURE — 36415 COLL VENOUS BLD VENIPUNCTURE: CPT

## 2024-11-18 PROCEDURE — 82306 VITAMIN D 25 HYDROXY: CPT

## 2024-11-18 PROCEDURE — 85025 COMPLETE CBC W/AUTO DIFF WBC: CPT

## 2024-11-19 LAB
25(OH)D3 SERPL-MCNC: 56 NG/ML (ref 30–100)
VIT B12 SERPL-MCNC: 274 PG/ML (ref 211–911)

## 2024-11-20 LAB — NONINV COLON CA DNA+OCC BLD SCRN STL QL: NEGATIVE

## 2024-12-27 DIAGNOSIS — F51.01 PRIMARY INSOMNIA: ICD-10-CM

## 2024-12-27 RX ORDER — TRAZODONE HYDROCHLORIDE 100 MG/1
TABLET ORAL
Qty: 90 TABLET | Refills: 0 | Status: SHIPPED | OUTPATIENT
Start: 2024-12-27

## 2024-12-30 DIAGNOSIS — F51.01 PRIMARY INSOMNIA: ICD-10-CM

## 2024-12-30 RX ORDER — TRAZODONE HYDROCHLORIDE 100 MG/1
TABLET ORAL
Qty: 90 TABLET | Refills: 0 | Status: SHIPPED | OUTPATIENT
Start: 2024-12-30

## 2025-01-06 ENCOUNTER — OFFICE VISIT (OUTPATIENT)
Dept: URGENT CARE | Facility: URGENT CARE | Age: 55
End: 2025-01-06
Payer: COMMERCIAL

## 2025-01-06 VITALS
OXYGEN SATURATION: 98 % | DIASTOLIC BLOOD PRESSURE: 78 MMHG | WEIGHT: 151.24 LBS | BODY MASS INDEX: 23.69 KG/M2 | SYSTOLIC BLOOD PRESSURE: 113 MMHG | RESPIRATION RATE: 18 BRPM | HEART RATE: 78 BPM | TEMPERATURE: 97.8 F

## 2025-01-06 DIAGNOSIS — L02.818 CUTANEOUS ABSCESS OF OTHER SITE: Primary | ICD-10-CM

## 2025-01-06 DIAGNOSIS — L03.818 CELLULITIS OF OTHER SPECIFIED SITE: ICD-10-CM

## 2025-01-06 PROCEDURE — 10060 I&D ABSCESS SIMPLE/SINGLE: CPT | Performed by: PHYSICIAN ASSISTANT

## 2025-01-06 PROCEDURE — 99214 OFFICE O/P EST MOD 30 MIN: CPT | Performed by: PHYSICIAN ASSISTANT

## 2025-01-06 PROCEDURE — 87075 CULTR BACTERIA EXCEPT BLOOD: CPT

## 2025-01-06 PROCEDURE — 87205 SMEAR GRAM STAIN: CPT

## 2025-01-06 PROCEDURE — 99070 SPECIAL SUPPLIES PHYS/QHP: CPT | Performed by: PHYSICIAN ASSISTANT

## 2025-01-06 PROCEDURE — 87070 CULTURE OTHR SPECIMN AEROBIC: CPT

## 2025-01-06 RX ORDER — DOXYCYCLINE 100 MG/1
100 CAPSULE ORAL 2 TIMES DAILY
Qty: 20 CAPSULE | Refills: 0 | Status: SHIPPED | OUTPATIENT
Start: 2025-01-06 | End: 2025-01-16

## 2025-01-06 NOTE — LETTER
Cutaneous abscess to left hip-   Abscess Care After Incision & Drainage     Your wound will need to be cleaned one to two times daily.      Remove outer dressing      Get in shower and allow warm water to run over the packing. This will loosen it and make removal less painful.      Remove packing      Allow warm, soapy water to run inside of the wound. Rinse and pat dry.      Repack wound then cover.       You will notice that each time you change your packing that it takes less and less each time. This is because your wound is healing from the inside out.

## 2025-01-06 NOTE — PROGRESS NOTES
Subjective   Patient ID: Maribel Thorpe is a 54 y.o. female. They present today with a chief complaint of sore on thigh (Patient states she has a little cyst on thigh that is infected.  It has been there for about a week, she tried to take care of it at home but getting worse ).    History of Present Illness  HPI  Pt reports dermatology appt in a month. She denies squeezing cyst. Pt reports cyst was there for 2 years. No injury. Pt has psoarisis and psoriatic arthritis.    Past Medical History  Allergies as of 01/06/2025    (No Known Allergies)       (Not in a hospital admission)       Past Medical History:   Diagnosis Date    Acute frontal sinusitis 08/24/2023    Acute maxillary sinusitis 08/24/2023    Acute maxillary sinusitis, unspecified 02/09/2015    Acute maxillary sinusitis    Acute pharyngitis 08/24/2023    Anxiety disorder, unspecified     Anxiety    Benign paroxysmal positional vertigo 08/24/2023    Cough 08/24/2023    Dizziness 08/24/2023    Folliculitis 08/24/2023    Myalgia 08/24/2023    Personal history of other diseases of the respiratory system 02/09/2015    History of acute bronchitis    Personal history of other diseases of the respiratory system 07/15/2015    History of acute pharyngitis    Sore throat 08/24/2023    Viral illness 08/24/2023       Past Surgical History:   Procedure Laterality Date    HYSTERECTOMY  12/10/2013    Hysterectomy        reports that she has been smoking cigarettes. She started smoking about 42 years ago. She has never used smokeless tobacco. She reports current alcohol use. She reports that she does not use drugs.    Review of Systems  Review of Systems                               Objective    Vitals:    01/06/25 0933   BP: 113/78   BP Location: Left arm   Patient Position: Sitting   BP Cuff Size: Adult   Pulse: 78   Resp: 18   Temp: 36.6 °C (97.8 °F)   TempSrc: Oral   SpO2: 98%   Weight: 68.6 kg (151 lb 3.8 oz)     No LMP recorded. Patient has had a  hysterectomy.    Physical Exam  Vitals and nursing note reviewed.   Constitutional:       Appearance: Normal appearance.   Cardiovascular:      Rate and Rhythm: Normal rate and regular rhythm.      Heart sounds: No murmur heard.  Pulmonary:      Effort: Pulmonary effort is normal.      Breath sounds: Normal breath sounds.   Skin:     General: Skin is warm and dry.      Comments: 6 cm x 6 cm with faint erythema outer portion and darker erythema in center with umbilicated center and firmness. Mildly tender and warm to touch. Expressed thin purulent drainage followed by thick cottage cheese consistency with blood, tolerated well.   Neurological:      General: No focal deficit present.      Mental Status: She is alert.         Incision and Drainage    Date/Time: 1/6/2025 10:20 AM    Performed by: Hoa Arellano PA-C  Authorized by: Hoa Arellano PA-C    Consent:     Consent obtained:  Verbal and written    Risks discussed:  Bleeding, incomplete drainage, pain and infection  Universal protocol:     Patient identity confirmed:  Verbally with patient  Location:     Type:  Abscess    Location:  Lower extremity    Lower extremity location:  Hip    Hip location:  L hip  Pre-procedure details:     Skin preparation:  Povidone-iodine  Anesthesia:     Anesthesia method:  Local infiltration    Local anesthetic:  Lidocaine 1% w/o epi (1.5 ml)  Procedure type:     Complexity:  Simple  Procedure details:     Incision types:  Single straight    Incision depth:  Dermal    Wound management:  Probed and deloculated    Drainage:  Bloody and purulent    Drainage amount:  Moderate    Wound treatment:  Wound left open  Post-procedure details:     Procedure completion:  Tolerated well, no immediate complications  Comments:      Mild bruising around incision from squeezing      Point of Care Test & Imaging Results from this visit  No results found for this visit on 01/06/25.   No results found.    Diagnostic study results (if  any) were reviewed by Hoa Arellano PA-C.    Assessment/Plan   Allergies, medications, history, and pertinent labs/EKGs/Imaging reviewed by Hoa Arellano PA-C.     Medical Decision Making  54 y.o. female with psoriasis and psoriatic arthritis present today with a chief complaint of abscess on left thigh x 1 week with hx of benign cyst there for couple years.  No fever.   Vitals stable. Exam remarkable for 6 cm x 6 cm with faint erythema outer portion and darker erythema in center with umbilicated center and firmness. Mildly tender and warm to touch. Expressed thin purulent drainage followed by thick cottage cheese consistency with blood, tolerated well.     Discussed Abscess Care After Incision & Drainage - wound culture obtained. See procedure note for I&D, risks reviewed.  Your wound will need to be cleaned one to two times daily.    Remove outer dressing    Get in shower and allow warm water to run over it. This will loosen it and make removal less painful.    Allow warm, soapy water to run inside of the wound. Rinse and pat dry.    May cover wound with bandaid to prevent rubbing on clothes.    You will notice that each time you change bandage less drainage. This is because your wound is healing from the inside out.     Go to ER if worsening redness, severe pain, fever or difficulty walking.   Follow with Dermatology for removal of cyst.    Cellulitis- start Doxycycline twice a day x 10 days. Take with food and probiotic. Avoid dairy products 1 hr before and after med. Stay upright for 1-2 hours after taking medication to prevent upset stomach.    Orders and Diagnoses  Diagnoses and all orders for this visit:  Cutaneous abscess of other site  -     Tissue/Wound Culture/Smear  -     doxycycline (Vibramycin) 100 mg capsule; Take 1 capsule (100 mg) by mouth 2 times a day for 10 days. Take with at least 8 ounces (large glass) of water, do not lie down for 30 minutes after  Cellulitis of other  specified site  Other orders  -     Incision and Drainage      Medical Admin Record      Patient disposition: Home    Electronically signed by Hoa Arellano PA-C  8:06 PM

## 2025-01-06 NOTE — LETTER
Abscess Care After Incision & Drainage     Your wound will need to be cleaned one to two times daily.      Remove outer dressing      Get in shower and allow warm water to run over it. This will loosen it and make removal less painful.      Allow warm, soapy water to run inside of the wound. Rinse and pat dry.      May cover wound with bandaid to prevent rubbing on clothes.       You will notice that each time you change bandage less drainage. This is because your wound is healing from the inside out.       Go to ER if worsening redness, severe pain, fever or difficulty walking.     Follow with Dermatology for removal of cyst.    Cellulitis- start Doxycycline twice a day x 10 days. Take with food and probiotic. Avoid dairy products 1 hr before and after med. Stay upright for 1-2 hours after taking medication to prevent upset stomach.

## 2025-01-07 DIAGNOSIS — F51.01 PRIMARY INSOMNIA: ICD-10-CM

## 2025-01-07 RX ORDER — TRAZODONE HYDROCHLORIDE 100 MG/1
TABLET ORAL
Qty: 90 TABLET | Refills: 0 | Status: SHIPPED | OUTPATIENT
Start: 2025-01-07

## 2025-01-11 LAB
BACTERIA SPEC CULT: NORMAL
GRAM STN SPEC: NORMAL
GRAM STN SPEC: NORMAL

## 2025-01-16 ENCOUNTER — OFFICE VISIT (OUTPATIENT)
Dept: URGENT CARE | Age: 55
End: 2025-01-16
Payer: COMMERCIAL

## 2025-01-16 ENCOUNTER — LAB (OUTPATIENT)
Dept: LAB | Facility: LAB | Age: 55
End: 2025-01-16
Payer: COMMERCIAL

## 2025-01-16 DIAGNOSIS — L40.0 PSORIASIS VULGARIS: Primary | ICD-10-CM

## 2025-01-16 DIAGNOSIS — J98.8 RESPIRATORY TRACT INFECTION: Primary | ICD-10-CM

## 2025-01-16 PROCEDURE — 86481 TB AG RESPONSE T-CELL SUSP: CPT

## 2025-01-16 RX ORDER — AZITHROMYCIN 250 MG/1
TABLET, FILM COATED ORAL
Qty: 6 TABLET | Refills: 0 | Status: SHIPPED | OUTPATIENT
Start: 2025-01-16

## 2025-01-16 NOTE — PROGRESS NOTES
Urgent Care Virtual Video Visit    Patient Location:  [place]  Provider Location: [facility]    Video visit completed with realtime synchronous video and or audio connection. Informed consent was obtained from the patient. Patient was made aware that my evaluation and diagnosis are limited due to the fact that we are not in the same room during the interview and that this is a virtual encounter that took place via audio and/or videoconferencing. Patient verbalized understanding.  I have communicated my name and active licensure.  The patient's identity and physical location were verified at the time of this visit.  Either the patient or their legal representative has been informed of the risks and benefits of and alternatives to treatment through a remote evaluation and consents to proceed with the evaluation remotely.      Subjective   Patient ID: Maribel Thorpe is a 54 y.o. female. They present today with a chief complaint of No chief complaint on file..    History of Present Illness:  54-year-old female presenting for URI symptoms x 5 to 6 days.  Symptoms include runny nose, coughing, congestion, feverish, chills, fatigue.  No chest pain or shortness of breath.  Abdominal pain, nausea, vomiting, diarrhea, rash.  No lower extremity swelling or pain.  No history of clots or clotting disorders.  No other concerns or complaints.  No known allergies.      Past Medical History  Allergies as of 01/16/2025    (No Known Allergies)       (Not in a hospital admission)         Past Medical History:   Diagnosis Date    Acute frontal sinusitis 08/24/2023    Acute maxillary sinusitis 08/24/2023    Acute maxillary sinusitis, unspecified 02/09/2015    Acute maxillary sinusitis    Acute pharyngitis 08/24/2023    Anxiety disorder, unspecified     Anxiety    Benign paroxysmal positional vertigo 08/24/2023    Cough 08/24/2023    Dizziness 08/24/2023    Folliculitis 08/24/2023    Myalgia 08/24/2023    Personal history of other  diseases of the respiratory system 02/09/2015    History of acute bronchitis    Personal history of other diseases of the respiratory system 07/15/2015    History of acute pharyngitis    Sore throat 08/24/2023    Viral illness 08/24/2023       Past Surgical History:   Procedure Laterality Date    HYSTERECTOMY  12/10/2013    Hysterectomy        reports that she has been smoking cigarettes. She started smoking about 42 years ago. She has never used smokeless tobacco. She reports current alcohol use. She reports that she does not use drugs.    Review of Systems  Review of Systems    After reviewing all body systems I have documented pertinent findings above in the history.  All other Systems reviewed and are negative for complaint.  Pertinent positive and negatives are listed in the above HPI.    Objective    There were no vitals filed for this visit.  No LMP recorded. Patient has had a hysterectomy.    Physical Exam    Constitutional: Well-developed, well-nourished.  Alert.  Overall well-appearing.  Head and face: Normal  Mouth: No lip or tongue swelling.  No trismus.  Neck: Neck is supple.  Pulmonary: No respiratory distress.     MDM:  An interactive audio and visual telecommunication system which permits real-time communication between the patient and provider was utilized to provide this telehealth service.    Patient presenting via telehealth for URI type symptoms.   No relief despite taking over-the-counter medications.      Patient does not appear toxic. No evidence of acute distress or respiratory compromise.  No tripoding. No nasal flaring.  Speaks in complete sentences.  Due to symptom onset/length, progression of worsening symptoms a bacterial URI is considered the most likely cause.  Through shared decision making the patient was prescribed antimicrobial therapy. Advised supportive therapy with over the counter medication and good follow up. Patient was instructed to follow-up with his PCP in the next 5 to  10 days if symptoms do not improve. Patient was advised returning to the emergency department or urgent care for an in person visit if any new or worsening symptoms.     Orders and Diagnoses  Diagnoses and all orders for this visit:  Respiratory tract infection  -     azithromycin (Zithromax) 250 mg tablet; Take 2 tabs (500 mg) by mouth today, then take 1 tab daily for 4 days.    Medical Admin Record    Patient disposition: Home    Electronically signed by Terrence Velez PA-C  2:43 PM

## 2025-01-16 NOTE — PATIENT INSTRUCTIONS
You were seen for prolonged and worsening cold like symptoms.  You most likely have a bacterial respiratory tract infection.  I recommend supportive therapy with the following medications below.    URI  1.  Please take *azithromycin* as prescribed  2.  Use Tea and honey for cough. This is known to work better than most OTC medications.  3.  Use pseudoephedrine Bromphen as prescribed  4.  Stay well-hydrated and drink lots of fluids.  5.  Follow up with your primary care physician in the next 5 to 10 days if symptoms do not improve.  6.  Return to ED care if symptoms worsen or new symptoms develop.    Based on clinical exam findings and history you most likely have a upper respiratory tract infection.  Your initial illness was likely caused by a virus that progressed to a secondary bacterial infection.  You are contagious as soon as the symptoms start.  Your symptoms may persist up to 2-3 weeks.  Symptoms usually peak by days 3 or 5.  Typical symptoms include nasal congestion, a runny nose, scratchy throat, cough, and irritability. The diagnosis is based on symptoms. Good hand hygiene is the best way to prevent these infections, and routine vaccination can help prevent influenza. Treatment aims to relieve symptoms. Rest and fluids. Tylenol and/or ibuprofen for fever and pain. Over the counter decongestants or mucolytics.

## 2025-01-19 LAB
NIL(NEG) CONTROL SPOT COUNT: NORMAL
PANEL A SPOT COUNT: 0
PANEL B SPOT COUNT: 0
POS CONTROL SPOT COUNT: NORMAL
T-SPOT. TB INTERPRETATION: NEGATIVE

## 2025-03-29 NOTE — PROGRESS NOTES
[facility]: [facility]  PROGRESS NOTE      Maribel Thorpe is a 54 y.o. female that is presenting today to establish care with new provider.  Her prior pcp was Dr. Saunders last seen on 11/11/24 for annual wellness exam.  She states she recently started Tremfya for her psoriasis.   She sees Dr. Sanchez dermatology.  She is current on health maintenance.  Today she does report snoring which wakes her up at night, tiredness around 2pm and nocturia.  She states her  states she snores.  Has erratic sleep patterns.  No headaches or daytime sleepiness.    HEALTH MAINTENANCE:   Last Office Visit: 11/11/24 - Dr. Saunders  Mammogram (40-75):  10/09/24 wnl  Pap smear (21-65, or hysterectomy q5yrs): per OB/GYN  Last Labs: 11/18/2024  Colonoscopy (45-75 or age 40 with 1st degree relative dx colon ca): negative cologuard  11/14/2024  Lung cancer screening (50-76 y/o x 20 pk yr for at least 20 yrs + current smoker OR quit in last 15 years, no CT w/I last year): never had  DEXA (65+, q 2 years): na    Assessment/Plan   Assessment & Plan  Encounter to establish care  Medications and problem list reconciled today       Psoriatic arthritis (Multi)  Was followed by dermatology Dr. Sanchze  Managed with tremfya        Anxiety  On no medications  Used ativan in the past       Gastroesophageal reflux disease without esophagitis  No issues and no recent flares       Hypothyroidism, unspecified type  Normal tsh 11/18/2024  On no medications       Ex-cigarette smoker    Orders:    CT lung screening low dose; Future    Nicotine dependence with current use  Quit in November no longer smokes       Encounter for immunization  T-Dap given today  Orders:    Tdap vaccine, age 7 years and older  (BOOSTRIX)      Subjective   HPI  Review of Systems   Constitutional: Negative.    HENT: Negative.     Respiratory:          Snores   Cardiovascular: Negative.    Gastrointestinal: Negative.    Skin: Negative.    Neurological:  "Negative.    Psychiatric/Behavioral: Negative.        Objective   There were no vitals filed for this visit.   There is no height or weight on file to calculate BMI.  Physical Exam  Vitals reviewed.   Constitutional:       Appearance: Normal appearance.   Cardiovascular:      Rate and Rhythm: Normal rate and regular rhythm.      Heart sounds: Normal heart sounds.   Pulmonary:      Effort: Pulmonary effort is normal.      Breath sounds: Normal breath sounds.   Abdominal:      General: Bowel sounds are normal.      Palpations: Abdomen is soft.   Skin:     General: Skin is warm and dry.   Neurological:      Mental Status: She is alert and oriented to person, place, and time.   Psychiatric:         Mood and Affect: Mood normal.         Behavior: Behavior normal.         Thought Content: Thought content normal.         Judgment: Judgment normal.       Diagnostic Results   Lab Results   Component Value Date    GLUCOSE 99 11/18/2024    CALCIUM 9.5 11/18/2024     11/18/2024    K 4.7 11/18/2024    CO2 29 11/18/2024     11/18/2024    BUN 16 11/18/2024    CREATININE 0.83 11/18/2024     Lab Results   Component Value Date    ALT 25 11/18/2024    AST 20 11/18/2024    ALKPHOS 62 11/18/2024    BILITOT 0.6 11/18/2024     Lab Results   Component Value Date    WBC 6.7 11/18/2024    HGB 13.8 11/18/2024    HCT 42.6 11/18/2024     11/18/2024     11/18/2024     Lab Results   Component Value Date    CHOL 188 11/18/2024    CHOL 165 08/22/2023    CHOL 177 09/15/2022     Lab Results   Component Value Date    HDL 87.6 11/18/2024    HDL 78.0 08/22/2023    HDL 71.0 09/15/2022     Lab Results   Component Value Date    LDLCALC 88 11/18/2024     Lab Results   Component Value Date    TRIG 62 11/18/2024    TRIG 82 08/22/2023    TRIG 117 09/15/2022     No components found for: \"CHOLHDL\"  No results found for: \"HGBA1C\"  Other labs not included in the list above were reviewed either before or during this encounter.    History  "   Past Medical History:   Diagnosis Date    Acute frontal sinusitis 08/24/2023    Acute maxillary sinusitis 08/24/2023    Acute maxillary sinusitis, unspecified 02/09/2015    Acute maxillary sinusitis    Acute pharyngitis 08/24/2023    Anxiety disorder, unspecified     Anxiety    Benign paroxysmal positional vertigo 08/24/2023    Cough 08/24/2023    Dizziness 08/24/2023    Folliculitis 08/24/2023    Myalgia 08/24/2023    Personal history of other diseases of the respiratory system 02/09/2015    History of acute bronchitis    Personal history of other diseases of the respiratory system 07/15/2015    History of acute pharyngitis    Sore throat 08/24/2023    Viral illness 08/24/2023     Past Surgical History:   Procedure Laterality Date    HYSTERECTOMY  12/10/2013    Hysterectomy     Family History   Problem Relation Name Age of Onset    No Known Problems Mother      No Known Problems Father      No Known Problems Maternal Grandmother      No Known Problems Maternal Grandfather      No Known Problems Paternal Grandmother      No Known Problems Paternal Grandfather       Social History     Socioeconomic History    Marital status:      Spouse name: Not on file    Number of children: Not on file    Years of education: Not on file    Highest education level: Not on file   Occupational History    Not on file   Tobacco Use    Smoking status: Every Day     Types: Cigarettes     Start date: 1983    Smokeless tobacco: Never    Tobacco comments:     0.5ppd - 41 year history     Has taken Chantix in the past and quit for 3 years   Vaping Use    Vaping status: Never Used   Substance and Sexual Activity    Alcohol use: Yes    Drug use: Never    Sexual activity: Not on file   Other Topics Concern    Not on file   Social History Narrative    Not on file     Social Drivers of Health     Financial Resource Strain: Not on file   Food Insecurity: Not on file   Transportation Needs: Not on file   Physical Activity: Not on file    Stress: Not on file   Social Connections: Not on file   Intimate Partner Violence: Not on file   Housing Stability: Not on file     No Known Allergies  Current Outpatient Medications on File Prior to Visit   Medication Sig Dispense Refill    azithromycin (Zithromax) 250 mg tablet Take 2 tabs (500 mg) by mouth today, then take 1 tab daily for 4 days. 6 tablet 0    meloxicam (Mobic) 15 mg tablet Take 1 tablet (15 mg) by mouth once daily. 30 tablet 0    Otezla 30 mg tablet Take 1 tablet (30 mg) by mouth 2 times a day.      traZODone (Desyrel) 100 mg tablet TAKE 1 TABLET(100 MG) BY MOUTH AT BEDTIME AS NEEDED FOR SLEEP 90 tablet 0    varenicline (Chantix) 0.5 mg tablet Take 1 tablet (0.5 mg) by mouth once daily for 3 days, THEN 1 tablet (0.5 mg) 2 times a day for 4 days, THEN 2 tablets (1 mg) 2 times a day. Take with full glass of water.. (Patient not taking: Reported on 11/11/2024) 319 tablet 0     No current facility-administered medications on file prior to visit.     Immunization History   Administered Date(s) Administered    COVID-19, mRNA, LNP-S, PF, 30 mcg/0.3 mL dose 07/23/2021, 08/22/2021    Flu vaccine (IIV4), preservative free *Check age/dose* 10/26/2022, 11/06/2023    Influenza, Unspecified 10/01/2023    Influenza, seasonal, injectable 10/04/2017    Zoster vaccine, recombinant, adult (SHINGRIX) 05/06/2022, 07/09/2022     Patient's medical history was reviewed and updated either before or during this encounter.       Shara Navarro, APRN-CNP

## 2025-03-31 ENCOUNTER — OFFICE VISIT (OUTPATIENT)
Dept: PRIMARY CARE | Facility: CLINIC | Age: 55
End: 2025-03-31
Payer: COMMERCIAL

## 2025-03-31 VITALS
OXYGEN SATURATION: 97 % | BODY MASS INDEX: 23.39 KG/M2 | TEMPERATURE: 97.6 F | DIASTOLIC BLOOD PRESSURE: 73 MMHG | HEART RATE: 70 BPM | SYSTOLIC BLOOD PRESSURE: 107 MMHG | WEIGHT: 149 LBS | HEIGHT: 67 IN

## 2025-03-31 DIAGNOSIS — Z87.891 EX-CIGARETTE SMOKER: ICD-10-CM

## 2025-03-31 DIAGNOSIS — F17.200 NICOTINE DEPENDENCE WITH CURRENT USE: ICD-10-CM

## 2025-03-31 DIAGNOSIS — R06.83 SNORING: ICD-10-CM

## 2025-03-31 DIAGNOSIS — Z76.89 ENCOUNTER TO ESTABLISH CARE: Primary | ICD-10-CM

## 2025-03-31 DIAGNOSIS — F41.9 ANXIETY: ICD-10-CM

## 2025-03-31 DIAGNOSIS — E03.9 HYPOTHYROIDISM, UNSPECIFIED TYPE: ICD-10-CM

## 2025-03-31 DIAGNOSIS — L40.50 PSORIATIC ARTHRITIS (MULTI): ICD-10-CM

## 2025-03-31 DIAGNOSIS — Z23 ENCOUNTER FOR IMMUNIZATION: ICD-10-CM

## 2025-03-31 DIAGNOSIS — K21.9 GASTROESOPHAGEAL REFLUX DISEASE WITHOUT ESOPHAGITIS: ICD-10-CM

## 2025-03-31 PROBLEM — R10.9 ABDOMINAL PAIN: Status: RESOLVED | Noted: 2023-08-24 | Resolved: 2025-03-31

## 2025-03-31 PROCEDURE — 99204 OFFICE O/P NEW MOD 45 MIN: CPT | Performed by: NURSE PRACTITIONER

## 2025-03-31 PROCEDURE — 1036F TOBACCO NON-USER: CPT | Performed by: NURSE PRACTITIONER

## 2025-03-31 PROCEDURE — 90715 TDAP VACCINE 7 YRS/> IM: CPT | Performed by: NURSE PRACTITIONER

## 2025-03-31 PROCEDURE — 90471 IMMUNIZATION ADMIN: CPT | Performed by: NURSE PRACTITIONER

## 2025-03-31 PROCEDURE — 3008F BODY MASS INDEX DOCD: CPT | Performed by: NURSE PRACTITIONER

## 2025-03-31 RX ORDER — GUSELKUMAB 100 MG/ML
INJECTION SUBCUTANEOUS
COMMUNITY
Start: 2025-02-25

## 2025-03-31 ASSESSMENT — ENCOUNTER SYMPTOMS
CARDIOVASCULAR NEGATIVE: 1
PSYCHIATRIC NEGATIVE: 1
NEUROLOGICAL NEGATIVE: 1
LOSS OF SENSATION IN FEET: 0
CONSTITUTIONAL NEGATIVE: 1
DEPRESSION: 0
GASTROINTESTINAL NEGATIVE: 1
OCCASIONAL FEELINGS OF UNSTEADINESS: 0

## 2025-03-31 ASSESSMENT — PAIN SCALES - GENERAL: PAINLEVEL_OUTOF10: 0-NO PAIN

## 2025-03-31 ASSESSMENT — PATIENT HEALTH QUESTIONNAIRE - PHQ9
1. LITTLE INTEREST OR PLEASURE IN DOING THINGS: NOT AT ALL
2. FEELING DOWN, DEPRESSED OR HOPELESS: NOT AT ALL
SUM OF ALL RESPONSES TO PHQ9 QUESTIONS 1 AND 2: 0

## 2025-04-21 ENCOUNTER — APPOINTMENT (OUTPATIENT)
Dept: RADIOLOGY | Facility: HOSPITAL | Age: 55
End: 2025-04-21
Payer: COMMERCIAL